# Patient Record
Sex: FEMALE | Race: WHITE | NOT HISPANIC OR LATINO | Employment: UNEMPLOYED | URBAN - METROPOLITAN AREA
[De-identification: names, ages, dates, MRNs, and addresses within clinical notes are randomized per-mention and may not be internally consistent; named-entity substitution may affect disease eponyms.]

---

## 2024-11-12 ENCOUNTER — OFFICE VISIT (OUTPATIENT)
Dept: URGENT CARE | Facility: CLINIC | Age: 24
End: 2024-11-12
Payer: COMMERCIAL

## 2024-11-12 VITALS
DIASTOLIC BLOOD PRESSURE: 75 MMHG | HEART RATE: 96 BPM | BODY MASS INDEX: 21.49 KG/M2 | SYSTOLIC BLOOD PRESSURE: 119 MMHG | WEIGHT: 129 LBS | OXYGEN SATURATION: 99 % | TEMPERATURE: 98 F | RESPIRATION RATE: 14 BRPM | HEIGHT: 65 IN

## 2024-11-12 DIAGNOSIS — R31.9 URINARY TRACT INFECTION WITH HEMATURIA, SITE UNSPECIFIED: Primary | ICD-10-CM

## 2024-11-12 DIAGNOSIS — N39.0 URINARY TRACT INFECTION WITH HEMATURIA, SITE UNSPECIFIED: Primary | ICD-10-CM

## 2024-11-12 LAB
SL AMB  POCT GLUCOSE, UA: ABNORMAL
SL AMB LEUKOCYTE ESTERASE,UA: ABNORMAL
SL AMB POCT BILIRUBIN,UA: ABNORMAL
SL AMB POCT BLOOD,UA: ABNORMAL
SL AMB POCT CLARITY,UA: ABNORMAL
SL AMB POCT COLOR,UA: YELLOW
SL AMB POCT KETONES,UA: ABNORMAL
SL AMB POCT NITRITE,UA: ABNORMAL
SL AMB POCT PH,UA: 6
SL AMB POCT SPECIFIC GRAVITY,UA: 1
SL AMB POCT URINE PROTEIN: 100
SL AMB POCT UROBILINOGEN: 0.2

## 2024-11-12 PROCEDURE — S9088 SERVICES PROVIDED IN URGENT: HCPCS | Performed by: PHYSICIAN ASSISTANT

## 2024-11-12 PROCEDURE — 87086 URINE CULTURE/COLONY COUNT: CPT | Performed by: PHYSICIAN ASSISTANT

## 2024-11-12 PROCEDURE — 81002 URINALYSIS NONAUTO W/O SCOPE: CPT | Performed by: PHYSICIAN ASSISTANT

## 2024-11-12 PROCEDURE — 99203 OFFICE O/P NEW LOW 30 MIN: CPT | Performed by: PHYSICIAN ASSISTANT

## 2024-11-12 RX ORDER — SEMAGLUTIDE 2.68 MG/ML
2 INJECTION, SOLUTION SUBCUTANEOUS
COMMUNITY
Start: 2024-05-23

## 2024-11-12 RX ORDER — SULFAMETHOXAZOLE AND TRIMETHOPRIM 800; 160 MG/1; MG/1
1 TABLET ORAL EVERY 12 HOURS SCHEDULED
Qty: 10 TABLET | Refills: 0 | Status: SHIPPED | OUTPATIENT
Start: 2024-11-12 | End: 2024-11-17

## 2024-11-12 NOTE — PROGRESS NOTES
[facility]        NAME: Luis Hernández is a 24 y.o. female  : 2000    MRN: [id number]  DATE: 2024  TIME: 2:34 PM    Assessment and Plan   Urinary tract infection with hematuria, site unspecified [N39.0, R31.9]  1. Urinary tract infection with hematuria, site unspecified  POCT urine dip    Urine culture    sulfamethoxazole-trimethoprim (BACTRIM DS) 800-160 mg per tablet        POCT urine showed large leukocytes and large blood will send urine for culture.  Patient Instructions   Patient was educated on UTI.  Patient was prescribed antibiotics.  Patient was told if UTI persist recommend follow-up with PCP or urologist.  Urology referral was placed    Follow up with PCP in 3-5 days.  Proceed to  ER if symptoms worsen.    If tests have been performed at [facility], our office will contact you with results if changes need to be made to the care plan discussed with you at the visit.  [facility]    Chief Complaint     Chief Complaint   Patient presents with    Possible UTI     Pt reports of increased frequency and urgency with pain while urination. S/S x 2 weeks.          History of Present Illness       Patient is a pleasant 24-year-old female who presents today with urinary frequency and urgency.  Last menstruation was 2024.  Patient reports she noted a UTI in 2024 that she was treated with Macrobid.  Patient then reports on 10/28/24 she was seen by telehealth and diagnosed with a UTI and prescribed Macrobid for 5 days.  Patient reports she feels as though the UTI from 10/28 never resolved.  Admits allergies to penicillin and pollen extract        Review of Systems   Review of Systems   Constitutional: Negative.    Respiratory: Negative.     Cardiovascular: Negative.    Genitourinary:  Positive for dysuria, frequency and urgency.   Psychiatric/Behavioral: Negative.           Current Medications       Current Outpatient  "Medications:     Ozempic, 2 MG/DOSE, 8 MG/3ML injection pen, Inject 2 mg under the skin, Disp: , Rfl:     sulfamethoxazole-trimethoprim (BACTRIM DS) 800-160 mg per tablet, Take 1 tablet by mouth every 12 (twelve) hours for 5 days, Disp: 10 tablet, Rfl: 0    Current Allergies     Allergies as of 11/12/2024 - Reviewed 11/12/2024   Allergen Reaction Noted    Penicillins Hives and Itching 09/09/2018    Pollen extract Allergic Rhinitis 04/08/2014            The following portions of the patient's history were reviewed and updated as appropriate: allergies, current medications, past family history, past medical history, past social history, past surgical history and problem list.     History reviewed. No pertinent past medical history.    History reviewed. No pertinent surgical history.    History reviewed. No pertinent family history.      Medications have been verified.        Objective   /75   Pulse 96   Temp 98 °F (36.7 °C)   Resp 14   Ht 5' 5\" (1.651 m)   Wt 58.5 kg (129 lb)   LMP 10/25/2024 (Approximate)   SpO2 99%   BMI 21.47 kg/m²   Patient's last menstrual period was 10/25/2024 (approximate).       Physical Exam     Physical Exam  Vitals and nursing note reviewed.   Constitutional:       Appearance: Normal appearance.   HENT:      Head: Normocephalic.   Cardiovascular:      Rate and Rhythm: Normal rate and regular rhythm.      Heart sounds: Normal heart sounds.   Pulmonary:      Breath sounds: Normal breath sounds. No wheezing.   Abdominal:      Palpations: Abdomen is soft.      Tenderness: There is no abdominal tenderness.   Neurological:      General: No focal deficit present.      Mental Status: She is alert.   Psychiatric:         Mood and Affect: Mood normal.         Behavior: Behavior normal.                   "

## 2024-11-12 NOTE — PATIENT INSTRUCTIONS
Patient was educated on UTI.  Patient was prescribed antibiotics.  Patient was told if UTI persist recommend follow-up with PCP or urologist.  Urology referral was placed

## 2024-11-13 LAB — BACTERIA UR CULT: NORMAL

## 2024-12-03 ENCOUNTER — APPOINTMENT (OUTPATIENT)
Dept: RADIOLOGY | Facility: CLINIC | Age: 24
End: 2024-12-03
Payer: COMMERCIAL

## 2024-12-03 ENCOUNTER — OFFICE VISIT (OUTPATIENT)
Dept: URGENT CARE | Facility: CLINIC | Age: 24
End: 2024-12-03
Payer: COMMERCIAL

## 2024-12-03 VITALS
OXYGEN SATURATION: 97 % | DIASTOLIC BLOOD PRESSURE: 73 MMHG | RESPIRATION RATE: 16 BRPM | TEMPERATURE: 97.3 F | SYSTOLIC BLOOD PRESSURE: 131 MMHG | HEART RATE: 76 BPM

## 2024-12-03 DIAGNOSIS — R50.9 FEVER, UNSPECIFIED FEVER CAUSE: ICD-10-CM

## 2024-12-03 DIAGNOSIS — R05.9 COUGH, UNSPECIFIED TYPE: ICD-10-CM

## 2024-12-03 DIAGNOSIS — J20.9 ACUTE BRONCHITIS, UNSPECIFIED ORGANISM: Primary | ICD-10-CM

## 2024-12-03 PROCEDURE — 71046 X-RAY EXAM CHEST 2 VIEWS: CPT

## 2024-12-03 PROCEDURE — S9088 SERVICES PROVIDED IN URGENT: HCPCS | Performed by: PHYSICIAN ASSISTANT

## 2024-12-03 PROCEDURE — 99214 OFFICE O/P EST MOD 30 MIN: CPT | Performed by: PHYSICIAN ASSISTANT

## 2024-12-03 RX ORDER — PREDNISONE 20 MG/1
40 TABLET ORAL DAILY
Qty: 10 TABLET | Refills: 0 | Status: SHIPPED | OUTPATIENT
Start: 2024-12-03 | End: 2024-12-08

## 2024-12-03 RX ORDER — BROMPHENIRAMINE MALEATE, PSEUDOEPHEDRINE HYDROCHLORIDE, AND DEXTROMETHORPHAN HYDROBROMIDE 2; 30; 10 MG/5ML; MG/5ML; MG/5ML
10 SYRUP ORAL 3 TIMES DAILY PRN
Qty: 120 ML | Refills: 0 | Status: SHIPPED | OUTPATIENT
Start: 2024-12-03

## 2024-12-03 NOTE — LETTER
December 3, 2024     Patient: Luis Hernández   YOB: 2000   Date of Visit: 12/3/2024       To Whom It May Concern:    It is my medical opinion that Luis Hernández may return to work on 12/04/2024 .    If you have any questions or concerns, please don't hesitate to call.         Sincerely,        Kristi Cobb PA-C    CC: No Recipients

## 2024-12-04 ENCOUNTER — RESULTS FOLLOW-UP (OUTPATIENT)
Dept: URGENT CARE | Facility: CLINIC | Age: 24
End: 2024-12-04

## 2024-12-04 NOTE — PROGRESS NOTES
[facility]        NAME: Luis Hernández is a 24 y.o. female  : 2000    MRN: [id number]  DATE: December 3, 2024  TIME: 7:40 PM    Assessment and Plan   Acute bronchitis, unspecified organism [J20.9]  1. Acute bronchitis, unspecified organism  XR chest pa and lateral    predniSONE 20 mg tablet    brompheniramine-pseudoephedrine-DM 30-2-10 MG/5ML syrup      2. Fever, unspecified fever cause  XR chest pa and lateral      Patient presents with cough and fever symptoms present for the last 5 days with fever starting 2 days ago recommend chest x-ray for further evaluation.  Chest x-ray demonstrates no acute cardiopulmonary disease recommend Bromfed Flonase, and prednisone.      Patient Instructions     Patient Instructions   Prednisone as prescribed.  Use Bromfed as directed as needed.  Recommend Flonase nasal spray.  Continue over-the-counter lozenges as needed.  Motrin and Tylenol as needed for fever or pain.  If symptoms or not improved in 3 to 5 days follow-up with PCP.  If symptoms worsen or new symptoms develop report to the emergency room.    Follow up with PCP in 3-5 days.  Proceed to  ER if symptoms worsen.    If tests have been performed at [facility], our office will contact you with results if changes need to be made to the care plan discussed with you at the visit. [facility]     Chief Complaint     Chief Complaint   Patient presents with    Flu Symptoms     Sx started 5 days ago, sore throat cough, congestion, temp 102, fatigue.         History of Present Illness       24 year old female presents with complaint of postnasal drip, sore throat, fever, and cough x 5 days. Pt reports symptoms started with sinus congestion and cough, now mostly a dry cough. Throat is very sore and raw feeling. Pt reports fever developed in the last 1-2 days T max 102. Pt reports cough has been dry.     Flu Symptoms  Associated symptoms include congestion, a sore throat,  fatigue, a fever and coughing. Pertinent negatives include no rhinorrhea, chest pain or shortness of breath.       Review of Systems   Review of Systems   Constitutional:  Positive for chills, fatigue and fever.   HENT:  Positive for congestion, postnasal drip and sore throat. Negative for rhinorrhea.    Respiratory:  Positive for cough. Negative for shortness of breath.    Cardiovascular:  Negative for chest pain.   Musculoskeletal:  Positive for myalgias.         Current Medications       Current Outpatient Medications:     brompheniramine-pseudoephedrine-DM 30-2-10 MG/5ML syrup, Take 10 mL by mouth 3 (three) times a day as needed for cough or congestion, Disp: 120 mL, Rfl: 0    predniSONE 20 mg tablet, Take 2 tablets (40 mg total) by mouth daily for 5 days, Disp: 10 tablet, Rfl: 0    Ozempic, 2 MG/DOSE, 8 MG/3ML injection pen, Inject 2 mg under the skin (Patient not taking: Reported on 12/3/2024), Disp: , Rfl:     Current Allergies     Allergies as of 12/03/2024 - Reviewed 11/12/2024   Allergen Reaction Noted    Penicillins Hives and Itching 09/09/2018    Pollen extract Allergic Rhinitis 04/08/2014            The following portions of the patient's history were reviewed and updated as appropriate: allergies, current medications, past family history, past medical history, past social history, past surgical history and problem list.     No past medical history on file.    No past surgical history on file.    No family history on file.      Medications have been verified.        Objective   /73   Pulse 76   Temp (!) 97.3 °F (36.3 °C) (Temporal)   Resp 16   LMP 10/25/2024 (Approximate)   SpO2 97%   Patient's last menstrual period was 10/25/2024 (approximate).       Physical Exam     Physical Exam  Vitals and nursing note reviewed.   Constitutional:       General: She is not in acute distress.     Appearance: Normal appearance. She is not ill-appearing or toxic-appearing.      Comments: Pt clearing her  throat regularly, notes this helps to suppress her coughing.    HENT:      Head: Normocephalic and atraumatic.      Jaw: No trismus.      Right Ear: Hearing, tympanic membrane, ear canal and external ear normal. There is no impacted cerumen. No foreign body.      Left Ear: Hearing, tympanic membrane, ear canal and external ear normal. There is no impacted cerumen. No foreign body.      Nose: Mucosal edema present. No nasal deformity, congestion or rhinorrhea.      Right Nostril: No foreign body, epistaxis or occlusion.      Left Nostril: No foreign body, epistaxis or occlusion.      Right Turbinates: Not enlarged, swollen or pale.      Left Turbinates: Not enlarged, swollen or pale.      Mouth/Throat:      Lips: Pink. No lesions.      Mouth: Mucous membranes are moist. No injury, oral lesions or angioedema.      Dentition: Normal dentition.      Tongue: No lesions. Tongue does not deviate from midline.      Palate: No mass and lesions.      Pharynx: Uvula midline. Pharyngeal swelling, posterior oropharyngeal erythema and postnasal drip present. No oropharyngeal exudate or uvula swelling.      Tonsils: No tonsillar exudate or tonsillar abscesses.      Comments: Mild to moderate swelling and erythema of the posterior oropharynx   Eyes:      General: Lids are normal. Gaze aligned appropriately. No allergic shiner.     Extraocular Movements: Extraocular movements intact.   Cardiovascular:      Rate and Rhythm: Normal rate and regular rhythm.      Heart sounds: Normal heart sounds, S1 normal and S2 normal.   Pulmonary:      Effort: Pulmonary effort is normal.      Breath sounds: Normal breath sounds.      Comments: Patient speaking in full sentences with no increased respiratory effort.   No audible wheezing or stridor.   Lymphadenopathy:      Cervical: No cervical adenopathy.   Skin:     General: Skin is warm and dry.   Neurological:      Mental Status: She is alert and oriented to person, place, and time.       "Coordination: Coordination is intact.      Gait: Gait is intact.   Psychiatric:         Attention and Perception: Attention and perception normal.         Mood and Affect: Mood and affect normal.         Speech: Speech normal.         Behavior: Behavior is cooperative.               Note: Portions of this record may have been created with voice recognition software. Occasional wrong word or \"sound a like\" substitutions may have occurred due to the inherent limitations of voice recognition software. Please read the chart carefully and recognize, using context, where substitutions have occurred.*      "

## 2024-12-04 NOTE — PATIENT INSTRUCTIONS
Prednisone as prescribed.  Use Bromfed as directed as needed.  Recommend Flonase nasal spray.  Continue over-the-counter lozenges as needed.  Motrin and Tylenol as needed for fever or pain.  If symptoms or not improved in 3 to 5 days follow-up with PCP.  If symptoms worsen or new symptoms develop report to the emergency room.

## 2024-12-07 ENCOUNTER — TELEMEDICINE (OUTPATIENT)
Dept: OTHER | Facility: HOSPITAL | Age: 24
End: 2024-12-07
Payer: COMMERCIAL

## 2024-12-07 DIAGNOSIS — J40 BRONCHITIS: Primary | ICD-10-CM

## 2024-12-07 PROCEDURE — 99213 OFFICE O/P EST LOW 20 MIN: CPT | Performed by: NURSE PRACTITIONER

## 2024-12-07 RX ORDER — BENZONATATE 100 MG/1
100 CAPSULE ORAL 3 TIMES DAILY PRN
Qty: 20 CAPSULE | Refills: 0 | Status: SHIPPED | OUTPATIENT
Start: 2024-12-07

## 2024-12-07 RX ORDER — AZITHROMYCIN 250 MG/1
TABLET, FILM COATED ORAL
Qty: 6 TABLET | Refills: 0 | Status: SHIPPED | OUTPATIENT
Start: 2024-12-07 | End: 2024-12-11

## 2024-12-07 NOTE — PROGRESS NOTES
Virtual Regular Visit  Name: Luis Hernández      : 2000      MRN: [id number]  Encounter Provider: Your Video Visit Provider  Encounter Date: 2024   Encounter department: VIRTUAL CARE       Verification of patient location:  Patient is located at Home in the following state in which I hold an active license [place] :  Assessment & Plan  Bronchitis    Orders:    azithromycin (ZITHROMAX) 250 mg tablet; Take 2 tablets today then 1 tablet daily x 4 days    benzonatate (TESSALON PERLES) 100 mg capsule; Take 1 capsule (100 mg total) by mouth 3 (three) times a day as needed for cough        Encounter provider Your Video Visit Provider    The patient was identified by name and date of birth. Luis Hernández was informed that this is a telemedicine visit and that the visit is being conducted through the Epic Embedded platform. She agrees to proceed..  My office door was closed. No one else was in the room.  She acknowledged consent and understanding of privacy and security of the video platform. The patient has agreed to participate and understands they can discontinue the visit at any time.    Patient is aware this is a billable service.     History was obtained from: History obtained from: patient  History of Present Illness     Had a negative cxr at care now 1 week ago    Cough  This is a new problem. The current episode started 1 to 4 weeks ago. The problem has been gradually worsening. The problem occurs constantly. The cough is Non-productive. Associated symptoms include nasal congestion and rhinorrhea. Pertinent negatives include no chest pain, chills, ear pain, fever, headaches, myalgias, postnasal drip, rash, sore throat, shortness of breath or wheezing. Nothing aggravates the symptoms. Treatments tried: bromphed and prednisone. The treatment provided mild relief. There is no history of asthma.     Review of Systems   Constitutional:  Negative for chills, diaphoresis, fatigue and fever.   HENT:  Positive  for congestion and rhinorrhea. Negative for ear pain, facial swelling, mouth sores, postnasal drip, sinus pressure, sinus pain, sore throat and trouble swallowing.    Eyes:  Negative for photophobia and visual disturbance.   Respiratory:  Positive for cough. Negative for chest tightness, shortness of breath and wheezing.    Cardiovascular:  Negative for chest pain.   Gastrointestinal:  Negative for abdominal pain, diarrhea, nausea and vomiting.   Genitourinary: Negative.    Musculoskeletal:  Negative for arthralgias, back pain, joint swelling, myalgias, neck pain and neck stiffness.   Skin:  Negative for rash.   Neurological:  Negative for dizziness, facial asymmetry, weakness, light-headedness, numbness and headaches.       Objective   LMP 10/25/2024 (Approximate)     Physical Exam  Constitutional:       General: She is not in acute distress.     Appearance: She is well-developed. She is not diaphoretic.   HENT:      Nose:      Right Sinus: No maxillary sinus tenderness or frontal sinus tenderness.      Left Sinus: No maxillary sinus tenderness or frontal sinus tenderness.      Mouth/Throat:      Tonsils: No tonsillar exudate.   Cardiovascular:      Comments: Unable to assess in this setting  Pulmonary:      Effort: Pulmonary effort is normal.      Comments: Patient able to converse in full sentences, easy non-labored respirations noted. No dyspnea observed.      Lymphadenopathy:      Cervical: No cervical adenopathy.   Skin:     Coloration: Skin is not pale.   Neurological:      Mental Status: She is alert and oriented to person, place, and time.         Visit Time  Total Visit Duration: 15 minutes not including the time spent for establishing the audio/video connection.